# Patient Record
Sex: MALE | Race: BLACK OR AFRICAN AMERICAN | NOT HISPANIC OR LATINO | URBAN - METROPOLITAN AREA
[De-identification: names, ages, dates, MRNs, and addresses within clinical notes are randomized per-mention and may not be internally consistent; named-entity substitution may affect disease eponyms.]

---

## 2021-01-03 ENCOUNTER — EMERGENCY (EMERGENCY)
Facility: HOSPITAL | Age: 36
LOS: 0 days | Discharge: HOME | End: 2021-01-03
Attending: EMERGENCY MEDICINE
Payer: COMMERCIAL

## 2021-01-03 VITALS
OXYGEN SATURATION: 97 % | HEIGHT: 60 IN | SYSTOLIC BLOOD PRESSURE: 123 MMHG | DIASTOLIC BLOOD PRESSURE: 71 MMHG | HEART RATE: 75 BPM | TEMPERATURE: 98 F | RESPIRATION RATE: 18 BRPM | WEIGHT: 199.96 LBS

## 2021-01-03 DIAGNOSIS — H57.89 OTHER SPECIFIED DISORDERS OF EYE AND ADNEXA: ICD-10-CM

## 2021-01-03 DIAGNOSIS — B30.9 VIRAL CONJUNCTIVITIS, UNSPECIFIED: ICD-10-CM

## 2021-01-03 PROCEDURE — 99283 EMERGENCY DEPT VISIT LOW MDM: CPT

## 2021-01-03 RX ORDER — ERYTHROMYCIN BASE 5 MG/GRAM
1 OINTMENT (GRAM) OPHTHALMIC (EYE) ONCE
Refills: 0 | Status: COMPLETED | OUTPATIENT
Start: 2021-01-03 | End: 2021-01-03

## 2021-01-03 RX ORDER — ERYTHROMYCIN BASE 5 MG/GRAM
1 OINTMENT (GRAM) OPHTHALMIC (EYE)
Qty: 2 | Refills: 0
Start: 2021-01-03 | End: 2021-01-09

## 2021-01-03 RX ADMIN — Medication 1 APPLICATION(S): at 11:24

## 2021-01-03 NOTE — ED PROVIDER NOTE - PATIENT PORTAL LINK FT
You can access the FollowMyHealth Patient Portal offered by [facility] by registering at the following website: [email or web address]. By joining [employer]’s FollowMyHealth portal, you will also be able to view your health information using other applications (apps) compatible with our system.

## 2021-01-03 NOTE — ED ADULT TRIAGE NOTE - CHIEF COMPLAINT QUOTE
Pt alert and oriented x4 c/o pain and redness to the Rt eye since Friday, denies any medical hx COVID neg on 12/30

## 2021-01-03 NOTE — ED PROVIDER NOTE - CLINICAL SUMMARY MEDICAL DECISION MAKING FREE TEXT BOX
DDx: viral conjunctivus, no purulent discharge to suggest bacterial, no pain with extraocular movement to suggest cellulitis  Plan: erythromycin ointment and d/c to follow up with ophthalmologist, already has an appointment in a week with ophthalmologist

## 2021-01-03 NOTE — ED PROVIDER NOTE - NSFOLLOWUPCLINICS_GEN_ALL_ED_FT
[facility] - Ophthalmology  Ophthalmology  [address], [address]  [place] [address]  Phone: [phone]  Fax:   Follow Up Time: 7-10 Days

## 2021-01-03 NOTE — ED ADULT NURSE NOTE - OBJECTIVE STATEMENT
Pt c/o rt eye burning, swelling redness x 2 days.  Pt saw virtual , was told to go to ER.  Pt denies loss of vision or blurred vision.

## 2021-01-03 NOTE — ED PROVIDER NOTE - EYES, MLM
Clear bilaterally. Left eye 20/30, right eye 20/100, but pt states that is baseline, right eye has watery discharge with diffuse conjunctival erythema, no pain with extra ocular movement, no foreign body sensation, PERRL,

## 2021-01-03 NOTE — ED ADULT NURSE NOTE - NS ED NURSE LEVEL OF CONSCIOUSNESS ORIENTATION
What Type Of Note Output Would You Prefer (Optional)?: Bullet Format
How Severe Is Your Acne?: moderate
Is This A New Presentation, Or A Follow-Up?: Follow Up Acne
Oriented - self; Oriented - place; Oriented - time

## 2021-01-03 NOTE — ED ADULT NURSE NOTE - CAS EDN DISCHARGE ASSESSMENT
pt sleeping well tonight. Easily awaken to verbal stimuli. cont to monitor Alert and oriented to person, place and time

## 2021-01-03 NOTE — ED PROVIDER NOTE - OBJECTIVE STATEMENT
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 Pt reports watery discharge. Pt wears contacts only on the left eye. He is suppose to wear contacts on the right eye, but has not worn it for 8 months because it does not improve his vision much. Denies fevers, pain, cough, sore throat, back pain, CP, abdominal pain, dizziness, headaches, body aches, chills, neck pain, or SOB. Recent COVID negative 12/30.